# Patient Record
Sex: MALE | URBAN - METROPOLITAN AREA
[De-identification: names, ages, dates, MRNs, and addresses within clinical notes are randomized per-mention and may not be internally consistent; named-entity substitution may affect disease eponyms.]

---

## 2017-05-08 ENCOUNTER — IMPORTED ENCOUNTER (OUTPATIENT)
Dept: URBAN - METROPOLITAN AREA CLINIC 1 | Facility: CLINIC | Age: 82
End: 2017-05-08

## 2017-05-08 PROBLEM — Z79.84: Noted: 2017-05-08

## 2017-05-08 PROBLEM — E11.9: Noted: 2017-05-08

## 2017-05-08 PROBLEM — H25.813: Noted: 2017-05-08

## 2017-05-08 PROBLEM — H16.143: Noted: 2017-05-08

## 2017-05-08 PROBLEM — H40.023: Noted: 2017-05-08

## 2017-05-08 PROBLEM — H04.123: Noted: 2017-05-08

## 2017-05-08 PROCEDURE — 92133 CPTRZD OPH DX IMG PST SGM ON: CPT

## 2017-05-08 PROCEDURE — 92015 DETERMINE REFRACTIVE STATE: CPT

## 2017-05-08 PROCEDURE — 92014 COMPRE OPH EXAM EST PT 1/>: CPT

## 2017-05-08 NOTE — PATIENT DISCUSSION
1.  DM Type II (Oral Meds) without sign of diabetic retinopathy and no blot heme on dilated retinal examination today OU No Macular Edema:  Discussed the pathophysiology of diabetes and its effect on the eye and risk of blindness. Stressed the importance of strong glucose control. Advised of importance of at least yearly dilated examinations but to contact us immediately for any problems or concerns. 2. Glaucoma Suspect OU : (CD 0.75 OU) Neg FM Hx. Risk of cupping. AA. IOP stable on no meds. OCT shows no progression OU. Continue to observe on no meds. Patient is considered high risk. 3.  Cataract OU: Observe for now without intervention. The patient was advised to contact us if any change or worsening of vision4. ABNER w/ PEK OU- Continue ATs TID OU Routinely. Letter to [place] Mcdaniels for an appointment in 1 yr 27 OCT with Dr. Sung Campbell.

## 2018-04-26 PROBLEM — E66.01 OBESITY, MORBID (HCC): Status: ACTIVE | Noted: 2018-04-26

## 2018-04-29 PROBLEM — Z87.891 PAST USE OF TOBACCO: Status: ACTIVE | Noted: 2018-04-29

## 2018-04-29 PROBLEM — J98.01 BRONCHOSPASM: Status: ACTIVE | Noted: 2018-03-27

## 2018-04-29 PROBLEM — Z77.090 H/O ASBESTOS EXPOSURE: Status: ACTIVE | Noted: 2018-03-28

## 2018-04-29 PROBLEM — D64.9 SEVERE ANEMIA: Status: ACTIVE | Noted: 2018-03-27

## 2018-04-29 PROBLEM — J11.1 FLU: Status: ACTIVE | Noted: 2018-01-30

## 2018-08-06 ENCOUNTER — IMPORTED ENCOUNTER (OUTPATIENT)
Dept: URBAN - METROPOLITAN AREA CLINIC 1 | Facility: CLINIC | Age: 83
End: 2018-08-06

## 2018-08-06 PROBLEM — Z79.84: Noted: 2018-08-06

## 2018-08-06 PROBLEM — H40.023: Noted: 2018-08-06

## 2018-08-06 PROBLEM — H25.813: Noted: 2018-08-06

## 2018-08-06 PROBLEM — H04.123: Noted: 2018-08-06

## 2018-08-06 PROBLEM — E11.9: Noted: 2018-08-06

## 2018-08-06 PROBLEM — H16.143: Noted: 2018-08-06

## 2018-08-06 PROCEDURE — 92133 CPTRZD OPH DX IMG PST SGM ON: CPT

## 2018-08-06 PROCEDURE — 92015 DETERMINE REFRACTIVE STATE: CPT

## 2018-08-06 PROCEDURE — 92014 COMPRE OPH EXAM EST PT 1/>: CPT

## 2018-08-06 NOTE — PATIENT DISCUSSION
1.  DM Type II without sign of diabetic retinopathy and no blot heme on dilated retinal examination today OU No Macular Edema: Stable. Discussed the pathophysiology of diabetes and its effect on the eye and risk of blindness. Stressed the importance of strong glucose control. Advised of importance of at least yearly dilated examinations but to contact us immediately for any problems or concerns. 2. Type II diabetes controlled by oral medications. 3.  Cataract OU: Observe for now without intervention. The patient was advised to contact us if any change or worsening of vision4. ABNER w/ decreased PEK OU- Improved. The continuation of artificial tears were recommended. 5.  Glaucoma Suspect OU (0.75 OU): Stable IOP and C/D OU. OCT shows minimal thinning OU no progression. Patient is considered high risk. Condition was discussed with patient and patient understands. Will continue to monitor patient for any progression in condition. Patient was advised to call us with any problems questions or concerns. 6. Return for an appointment for 30/OCT/glare in 1 year with Dr. Thomas Gloria.

## 2019-04-23 PROBLEM — Z79.01 CHRONIC ANTICOAGULATION: Status: ACTIVE | Noted: 2018-05-24

## 2019-08-06 ENCOUNTER — IMPORTED ENCOUNTER (OUTPATIENT)
Dept: URBAN - METROPOLITAN AREA CLINIC 1 | Facility: CLINIC | Age: 84
End: 2019-08-06

## 2019-08-06 PROBLEM — H16.143: Noted: 2019-08-06

## 2019-08-06 PROBLEM — H04.123: Noted: 2019-08-06

## 2019-08-06 PROBLEM — H40.013: Noted: 2019-08-06

## 2019-08-06 PROBLEM — H25.813: Noted: 2019-08-06

## 2019-08-06 PROBLEM — R73.09: Noted: 2019-08-06

## 2019-08-06 PROBLEM — H43.812: Noted: 2019-08-06

## 2019-08-06 PROCEDURE — 92014 COMPRE OPH EXAM EST PT 1/>: CPT

## 2019-08-06 PROCEDURE — 92133 CPTRZD OPH DX IMG PST SGM ON: CPT

## 2019-08-06 NOTE — PATIENT DISCUSSION
1.  DM Type II (Diet Controlled) without sign of diabetic retinopathy and no blot heme on dilated retinal examination today OU No Macular Edema:  Discussed the pathophysiology of diabetes and its effect on the eye and risk of blindness. Stressed the importance of strong glucose control. Advised of importance of at least yearly dilated examinations but to contact us immediately for any problems or concerns. 2. Glaucoma Suspect OU (CD 0.80/0.75): OCT shows minimal thinning OU. IOP stable. Negative family hx. AA. Patient is considered Low Risk. Condition was discussed with patient and patient understands. Will continue to monitor patient for any progression in condition. Patient was advised to call us with any problems questions or concerns. 3.  Cataract OU: Observe for now without intervention. The patient was advised to contact us if any change or worsening of vision4. ABNER w/ PEK OU- Recommend ATs TID OU routinely 5. PVD w/o Tear OS- RD precautions. Patient deferred Manifest Rx today. Return for an appointment in 1 year 30/OCT/glare with Dr. Everardo Shelton.

## 2019-11-26 PROBLEM — N12 PYELONEPHRITIS: Status: ACTIVE | Noted: 2019-07-26

## 2019-11-26 PROBLEM — N17.9 ACUTE KIDNEY INJURY (HCC): Status: ACTIVE | Noted: 2019-07-27

## 2019-11-26 PROBLEM — J44.9 CHRONIC OBSTRUCTIVE PULMONARY DISEASE (HCC): Status: ACTIVE | Noted: 2019-11-26

## 2019-11-26 PROBLEM — E87.1 HYPONATREMIA: Status: ACTIVE | Noted: 2019-07-27

## 2020-08-04 ENCOUNTER — IMPORTED ENCOUNTER (OUTPATIENT)
Dept: URBAN - METROPOLITAN AREA CLINIC 1 | Facility: CLINIC | Age: 85
End: 2020-08-04

## 2020-08-04 PROBLEM — H40.013: Noted: 2020-08-04

## 2020-08-04 PROBLEM — H16.143: Noted: 2020-08-04

## 2020-08-04 PROBLEM — H43.812: Noted: 2020-08-04

## 2020-08-04 PROBLEM — H04.123: Noted: 2020-08-04

## 2020-08-04 PROBLEM — H40.033: Noted: 2020-08-04

## 2020-08-04 PROBLEM — R73.09: Noted: 2020-08-04

## 2020-08-04 PROBLEM — H25.813: Noted: 2020-08-04

## 2020-08-04 PROCEDURE — 92133 CPTRZD OPH DX IMG PST SGM ON: CPT

## 2020-08-04 PROCEDURE — 92014 COMPRE OPH EXAM EST PT 1/>: CPT

## 2020-08-04 PROCEDURE — 92015 DETERMINE REFRACTIVE STATE: CPT

## 2020-08-04 NOTE — PATIENT DISCUSSION
Cataract OU:  Visually Significant second to glare and patient has potentially occludible narrow angles. Discussed the risks benefits alternatives and limitations of cataract surgery. The patient stated a full understanding and a desire to proceed with the procedure. The patient will need to return for preop appointment with cataract measurements and to have any additional questions answered and start pre-operative eye drops as directed. Phaco PCL OS then OD Otherwise follow-up 6 months 10/DFE/glare

## 2020-08-04 NOTE — PATIENT DISCUSSION
1.  DM Type II (Diet Controlled) without sign of diabetic retinopathy and no blot heme on dilated retinal examination today OU No Macular Edema:  Discussed the pathophysiology of diabetes and its effect on the eye and risk of blindness. Stressed the importance of strong glucose control. Advised of importance of at least yearly dilated examinations but to contact us immediately for any problems or concerns. 2. Glaucoma Suspect OU (CD 0.80/0.75): No progression shown on OCT. IOP stable. Negative family hx. AA. Patient is considered Low Risk. Condition was discussed with patient and patient understands. Will continue to monitor patient for any progression in condition. Patient was advised to call us with any problems questions or concerns. 3.  Cataract OU:  Visually Significant second to glare and patient has potentially occludible narrow angles. Discussed the risks benefits alternatives and limitations of cataract surgery. The patient stated a full understanding and a desire to proceed with the procedure. The patient will need to return for preop appointment with cataract measurements and to have any additional questions answered and start pre-operative eye drops as directed. Phaco PCL OS then OD Otherwise follow-up 6 months 10/DFE/glare 4. ABNER w/ PEK OU- Recommend ATs TID OU routinely 5. Narrow Angles OU - Will defer Yag PI second to scheduling phaco. 6.  PVD w/o Tear OS- RD precautions. Return for an appointment for Ascan/H and P. with Dr. Willa Rosenthal.

## 2020-09-08 ENCOUNTER — IMPORTED ENCOUNTER (OUTPATIENT)
Dept: URBAN - METROPOLITAN AREA CLINIC 1 | Facility: CLINIC | Age: 85
End: 2020-09-08

## 2020-09-08 PROBLEM — H25.812: Noted: 2020-09-08

## 2020-09-08 PROCEDURE — 92136 OPHTHALMIC BIOMETRY: CPT

## 2020-09-08 NOTE — PATIENT DISCUSSION
1. Cataract OS --  Visually Significant secondary to glare discussed the risks benefits alternatives and limitations of cataract surgery. The patient stated a full understanding and a desire to proceed with the procedure. Discussed with patient if PO Gtts are more than $120 for all three combined when filling at their Pharmacy please call our office to request generic substitutions. Pt came to pre-op appointment today alone and Lifestyle Questionnaire Completed. Pt understands they will need glasses post-op to achieve their best corrected vision.  Phaco PCL OS

## 2020-09-17 ENCOUNTER — IMPORTED ENCOUNTER (OUTPATIENT)
Dept: URBAN - METROPOLITAN AREA CLINIC 1 | Facility: CLINIC | Age: 85
End: 2020-09-17

## 2020-09-17 PROBLEM — Z96.1: Noted: 2020-09-17

## 2020-09-17 PROCEDURE — 99024 POSTOP FOLLOW-UP VISIT: CPT

## 2020-09-17 NOTE — PATIENT DISCUSSION
POD#1 CE/IOL Standard OS doing well. Анна Hernandez in place. D/c Pred. Use Prolensa Qdaily OS Vigamox TID OS : Use gtts through completion of PO gtt chart regimen/ Per our instructions given to patient.   Post op Warnings Reiterated RTC as scheduled

## 2020-09-24 ENCOUNTER — IMPORTED ENCOUNTER (OUTPATIENT)
Dept: URBAN - METROPOLITAN AREA CLINIC 1 | Facility: CLINIC | Age: 85
End: 2020-09-24

## 2020-09-24 PROBLEM — H25.811: Noted: 2020-09-24

## 2020-09-24 PROCEDURE — 92136 OPHTHALMIC BIOMETRY: CPT

## 2020-09-24 NOTE — PATIENT DISCUSSION
1.  Cataract OD -- Visually Significant secondary to glare discussed the risks benefits alternatives and limitations of cataract surgery. The patient stated a full understanding and a desire to proceed with the procedure. Discussed with patient if PO Gtts are more than $120 for all three combined when filling at their Pharmacy please call our office to request generic substitutions. Pt understands they will need glasses post-op to achieve their best corrected vision. Phaco PCL OD 2. POW#3  CE/IOL Standard OS doing well. Ramonia Creeks in place. Use Prolensa Qdaily OS: Use gtts through completion of PO gtt chart regimen.  F/u as scheduled 2nd eye

## 2020-09-30 ENCOUNTER — IMPORTED ENCOUNTER (OUTPATIENT)
Dept: URBAN - METROPOLITAN AREA CLINIC 1 | Facility: CLINIC | Age: 85
End: 2020-09-30

## 2020-10-01 ENCOUNTER — IMPORTED ENCOUNTER (OUTPATIENT)
Dept: URBAN - METROPOLITAN AREA CLINIC 1 | Facility: CLINIC | Age: 85
End: 2020-10-01

## 2020-10-01 PROBLEM — Z96.1: Noted: 2020-10-01

## 2020-10-01 PROCEDURE — 99024 POSTOP FOLLOW-UP VISIT: CPT

## 2020-10-01 NOTE — PATIENT DISCUSSION
1. POD#1 Phaco/ PCL OD (Standard) doing well. *Marthann Elli in place. Use Prolensa Qdaily OD & Ocuflox TID OD : Use all gtts through completion of PO gtt chart regimen/ Per our instructions given. Post op Warnings Reiterated 2. POW#3 Phaco/ PCL OS (Standard) doing well. *Marthann Elli in place. Use Prolensa Qdaily OS: Use gtts through completion of PO gtt chart regimen.  RTC as scheduled

## 2020-10-22 ENCOUNTER — IMPORTED ENCOUNTER (OUTPATIENT)
Dept: URBAN - METROPOLITAN AREA CLINIC 1 | Facility: CLINIC | Age: 85
End: 2020-10-22

## 2020-10-22 PROBLEM — Z09: Noted: 2020-10-22

## 2020-10-22 PROCEDURE — 99024 POSTOP FOLLOW-UP VISIT: CPT

## 2020-10-22 NOTE — PATIENT DISCUSSION
POW#3 Phaco/ PCL Standard OU w/ Dextenza. Finish PO meds per PO gtt schedule MRX for glasses givenReturn for an appointment in July for a 30/OCT with Dr. Edilberto Hunt.

## 2021-01-26 ENCOUNTER — IMPORTED ENCOUNTER (OUTPATIENT)
Dept: URBAN - METROPOLITAN AREA CLINIC 1 | Facility: CLINIC | Age: 86
End: 2021-01-26

## 2021-03-22 ENCOUNTER — TELEPHONE (OUTPATIENT)
Dept: ORTHOPEDIC SURGERY | Age: 86
End: 2021-03-22

## 2021-03-22 NOTE — TELEPHONE ENCOUNTER
Pt was seen for an initial consultation in 2016 and none since. Pt can get on my chart and print out the OV note or can sign a ROR. This will have the information they need.

## 2021-03-22 NOTE — TELEPHONE ENCOUNTER
Patient's HIPAA person Donna Francisco called requesting a letter that dictates exactly when the patient back in 2016 was last treated and what he was treated for, for [facility] purposes.      [address] [facility]

## 2021-07-22 ENCOUNTER — IMPORTED ENCOUNTER (OUTPATIENT)
Dept: URBAN - METROPOLITAN AREA CLINIC 1 | Facility: CLINIC | Age: 86
End: 2021-07-22

## 2021-07-22 PROBLEM — H40.013: Noted: 2021-07-22

## 2021-07-22 PROBLEM — H04.123: Noted: 2021-07-22

## 2021-07-22 PROBLEM — H16.143: Noted: 2021-07-22

## 2021-07-22 PROBLEM — R73.09: Noted: 2021-07-22

## 2021-07-22 PROCEDURE — 92133 CPTRZD OPH DX IMG PST SGM ON: CPT

## 2021-07-22 PROCEDURE — 99214 OFFICE O/P EST MOD 30 MIN: CPT

## 2021-07-22 NOTE — PATIENT DISCUSSION
1.  DM Type II (Diet Controlled) without sign of diabetic retinopathy and no blot heme on dilated retinal examination today OU No Macular Edema:  Discussed the pathophysiology of diabetes and its effect on the eye and risk of blindness. Stressed the importance of strong glucose control. Advised of importance of at least yearly dilated examinations but to contact us immediately for any problems or concerns. 2. Glaucoma Suspect OU (CD 0.80/0.7) -- No progression shown on OCT. IOP stable. Negative family hx. AA. Patient is considered Low Risk. Condition was discussed with patient and patient understands. Will continue to monitor patient for any progression in condition. Patient was advised to call us with any problems questions or concerns. 3.  ABNER w/ PEK OU -- Recommend ATs TID OU routinely 4. Pseudophakia OU -- Standard OU. Doing well. 5.  PVD w/o Tear OS- RD precautions. Letter to PCP. Return for an appointment for 1 year for a 30 with Dr. Kelvin Giles.

## 2021-08-03 PROBLEM — I10 UNSPECIFIED ESSENTIAL HYPERTENSION: Status: RESOLVED | Noted: 2021-08-03 | Resolved: 2021-08-03

## 2021-08-03 PROBLEM — I10 HTN (HYPERTENSION): Status: RESOLVED | Noted: 2021-08-03 | Resolved: 2021-08-03

## 2022-03-18 PROBLEM — D64.9 SEVERE ANEMIA: Status: ACTIVE | Noted: 2018-03-27

## 2022-03-18 PROBLEM — E66.01 OBESITY, MORBID (HCC): Status: ACTIVE | Noted: 2018-04-26

## 2022-03-19 PROBLEM — Z77.090 H/O ASBESTOS EXPOSURE: Status: ACTIVE | Noted: 2018-03-28

## 2022-03-19 PROBLEM — J98.01 BRONCHOSPASM: Status: ACTIVE | Noted: 2018-03-27

## 2022-03-19 PROBLEM — N17.9 ACUTE KIDNEY INJURY (HCC): Status: ACTIVE | Noted: 2019-07-27

## 2022-03-19 PROBLEM — J44.9 CHRONIC OBSTRUCTIVE PULMONARY DISEASE (HCC): Status: ACTIVE | Noted: 2019-11-26

## 2022-03-19 PROBLEM — J11.1 FLU: Status: ACTIVE | Noted: 2018-01-30

## 2022-03-19 PROBLEM — Z79.01 CHRONIC ANTICOAGULATION: Status: ACTIVE | Noted: 2018-05-24

## 2022-03-19 PROBLEM — Z87.891 PAST USE OF TOBACCO: Status: ACTIVE | Noted: 2018-04-29

## 2022-03-20 PROBLEM — E87.1 HYPONATREMIA: Status: ACTIVE | Noted: 2019-07-27

## 2022-03-20 PROBLEM — N12 PYELONEPHRITIS: Status: ACTIVE | Noted: 2019-07-26

## 2022-04-02 ASSESSMENT — VISUAL ACUITY
OD_CC: 20/25
OD_CC: J1
OD_SC: 20/40
OD_GLARE: 20/400
OS_GLARE: 20/60
OD_CC: 20/30+1
OS_SC: 20/20-2
OD_GLARE: 20/400
OD_SC: 20/40
OS_SC: J2
OS_SC: 20/30
OD_GLARE: 20/60
OS_GLARE: 20/400
OS_GLARE: 20/400
OD_SC: 20/20
OD_SC: J2
OD_GLARE: 20/400
OS_SC: 20/40
OS_CC: 20/25
OD_CC: J1
OS_CC: J2
OS_GLARE: 20/400
OS_CC: J1
OS_CC: 20/30
OS_CC: J1
OS_CC: 20/30
OS_CC: 20/40
OS_CC: 20/20-2
OD_CC: 20/40
OD_SC: 20/20
OS_CC: 20/60
OD_CC: J2
OD_CC: 20/30
OD_CC: 20/20-2
OS_SC: 20/20
OS_CC: 20/30

## 2022-04-02 ASSESSMENT — TONOMETRY
OS_IOP_MMHG: 16
OD_IOP_MMHG: 12
OD_IOP_MMHG: 14
OS_IOP_MMHG: 14
OS_IOP_MMHG: 12
OD_IOP_MMHG: 14
OS_IOP_MMHG: 13
OS_IOP_MMHG: 13
OS_IOP_MMHG: 17
OD_IOP_MMHG: 15
OS_IOP_MMHG: 13
OS_IOP_MMHG: 15
OD_IOP_MMHG: 17
OS_IOP_MMHG: 17
OD_IOP_MMHG: 14
OD_IOP_MMHG: 15

## 2022-07-20 PROBLEM — R42 VERTIGO: Status: ACTIVE | Noted: 2020-12-14

## 2022-10-21 ENCOUNTER — COMPREHENSIVE EXAM (OUTPATIENT)
Dept: URBAN - METROPOLITAN AREA CLINIC 1 | Facility: CLINIC | Age: 87
End: 2022-10-21

## 2022-10-21 DIAGNOSIS — H26.493: ICD-10-CM

## 2022-10-21 DIAGNOSIS — H43.812: ICD-10-CM

## 2022-10-21 DIAGNOSIS — H40.013: ICD-10-CM

## 2022-10-21 DIAGNOSIS — H10.13: ICD-10-CM

## 2022-10-21 DIAGNOSIS — Z96.1: ICD-10-CM

## 2022-10-21 DIAGNOSIS — E11.9: ICD-10-CM

## 2022-10-21 DIAGNOSIS — H16.143: ICD-10-CM

## 2022-10-21 DIAGNOSIS — H04.123: ICD-10-CM

## 2022-10-21 PROCEDURE — 99214 OFFICE O/P EST MOD 30 MIN: CPT

## 2022-10-21 PROCEDURE — 92015 DETERMINE REFRACTIVE STATE: CPT

## 2022-10-21 ASSESSMENT — VISUAL ACUITY
OS_CC: 20/25-2
OD_CC: J1
OS_CC: J1
OD_CC: 20/20-2

## 2022-10-21 ASSESSMENT — TONOMETRY
OS_IOP_MMHG: 11
OD_IOP_MMHG: 12

## 2022-10-21 NOTE — PATIENT DISCUSSION
(Diet Controlled) without sign of diabetic retinopathy and no blot heme on dilated retinal examination today OU No Macular Edema: Discussed the pathophysiology of diabetes and its effect on the eye and risk of blindness. Stressed the importance of strong glucose control. Advised of importance of at least yearly dilated examinations but to contact us immediately for any problems or concerns.

## 2022-10-21 NOTE — PATIENT DISCUSSION
(CD 0.80/0.7) -- IOP stable. Negative family hx. AA. Patient is considered Low Risk. Condition was discussed with patient and patient understands. Will continue to monitor patient for any progression in condition. Patient was advised to call us with any problems questions or concerns.

## 2022-10-21 NOTE — PATIENT DISCUSSION
Recommend OTC Pataday QD OU, PRN itching. Condition discussed with patient. Avoidance of allergens recommended. Recommend Lid and lash hygiene with baby shampoo in the shower.

## 2023-06-08 PROBLEM — C64.9 RENAL CELL CARCINOMA (HCC): Status: ACTIVE | Noted: 2023-06-08

## 2023-06-08 PROBLEM — E78.5 OTHER AND UNSPECIFIED HYPERLIPIDEMIA: Status: ACTIVE | Noted: 2023-06-08

## 2023-06-08 PROBLEM — R06.02 SHORTNESS OF BREATH: Status: ACTIVE | Noted: 2018-03-27

## 2023-06-08 PROBLEM — R07.9 CHEST PAIN: Status: ACTIVE | Noted: 2023-02-16

## 2023-06-08 PROBLEM — R53.1 WEAKNESS: Status: ACTIVE | Noted: 2023-05-06

## 2023-06-08 PROBLEM — N39.0 URINARY TRACT INFECTION WITHOUT HEMATURIA: Status: ACTIVE | Noted: 2023-06-08

## 2023-06-08 PROBLEM — R55 NEAR SYNCOPE: Status: ACTIVE | Noted: 2023-06-08

## 2023-06-08 PROBLEM — J98.4 OTHER DISEASES OF LUNG, NOT ELSEWHERE CLASSIFIED: Status: ACTIVE | Noted: 2023-06-08

## 2023-06-08 PROBLEM — R10.13 EPIGASTRIC PAIN: Status: ACTIVE | Noted: 2023-05-06

## 2023-06-08 PROBLEM — E66.9 OBESE: Status: ACTIVE | Noted: 2018-03-27

## 2023-08-02 ENCOUNTER — OFFICE VISIT (OUTPATIENT)
Age: 88
End: 2023-08-02
Payer: MEDICARE

## 2023-08-02 VITALS — BODY MASS INDEX: 34.69 KG/M2 | WEIGHT: 234.2 LBS | HEIGHT: 69 IN

## 2023-08-02 DIAGNOSIS — C64.9 RENAL CELL CARCINOMA, UNSPECIFIED LATERALITY (HCC): ICD-10-CM

## 2023-08-02 DIAGNOSIS — M25.552 PAIN OF LEFT HIP: Primary | ICD-10-CM

## 2023-08-02 PROCEDURE — 99213 OFFICE O/P EST LOW 20 MIN: CPT | Performed by: PHYSICIAN ASSISTANT

## 2023-08-02 PROCEDURE — G8417 CALC BMI ABV UP PARAM F/U: HCPCS | Performed by: PHYSICIAN ASSISTANT

## 2023-08-02 PROCEDURE — 1036F TOBACCO NON-USER: CPT | Performed by: PHYSICIAN ASSISTANT

## 2023-08-02 PROCEDURE — 1123F ACP DISCUSS/DSCN MKR DOCD: CPT | Performed by: PHYSICIAN ASSISTANT

## 2023-08-02 PROCEDURE — G8428 CUR MEDS NOT DOCUMENT: HCPCS | Performed by: PHYSICIAN ASSISTANT

## 2023-08-02 NOTE — PROGRESS NOTES
Patient: Doretha Sue Sr. MRN: [id number]       SSN: [id number]  YOB: 1935        AGE: 82 y.o. SEX: male      PCP: Rodrigue Daniel MD  08/02/23    Chief Complaint   Patient presents with    Hip Pain     Lt        HISTORY:    Shayna Vincent Sr. is a 82 y.o. male presents to the office he believes for a concern regarding his left hip. Patient has a history documented of renal cell carcinoma. He is joined by his wife today. He reports pre-existing pain to his left hip which she has been treating with 70% alcohol \"wintergreen\" rubs. He reports no pain to the left hip today or low back.         No results found for: HBA1C, KNE1LDTI  Weight Metrics 8/2/2023 6/8/2023 2/9/2023 7/20/2022   Weight 234 lb 3.2 oz 228 lb 255 lb 255 lb   BMI (Calculated) 34.7 kg/m2 33.7 kg/m2 37.7 kg/m2 37.7 kg/m2          Problem List Items Addressed This Visit          Genitourinary    Renal cell carcinoma ([address])     Other Visit Diagnoses       Pain of left hip    -  Primary            PAST MEDICAL HISTORY:       Diagnosis Date    Aortic valve disorders     Arthritis 10/18/2010    BPH (benign prostatic hyperplasia)     Bronchitis, not specified as acute or chronic     Chronic diastolic heart failure (HCC)     Stable     Chronic kidney disease, stage II (mild)     Elevated prostate specific antigen (PSA)     Hypopotassemia     Nephrolithiasis     Obesity, unspecified     Obstructive sleep apnea (adult) (pediatric)     Other diseases of lung, not elsewhere classified     Pain in joint, pelvic region and thigh     Periodic limb movement disorder     Pulmonary embolus (HCC)     Pure hypercholesterolemia     Renal mass     Sciatica     Shortness of breath     Spinal stenosis, unspecified region other than cervical     Thoracic or lumbosacral neuritis or radiculitis, unspecified     Type II or unspecified type diabetes mellitus without mention of complication, not stated as uncontrolled

## 2024-07-24 ENCOUNTER — OFFICE VISIT (OUTPATIENT)
Age: 89
End: 2024-07-24
Payer: MEDICARE

## 2024-07-24 VITALS
DIASTOLIC BLOOD PRESSURE: 88 MMHG | TEMPERATURE: 97.2 F | OXYGEN SATURATION: 97 % | HEART RATE: 65 BPM | WEIGHT: 184.2 LBS | HEIGHT: 69 IN | SYSTOLIC BLOOD PRESSURE: 157 MMHG | BODY MASS INDEX: 27.28 KG/M2

## 2024-07-24 DIAGNOSIS — R94.31 ABNORMAL ECG: ICD-10-CM

## 2024-07-24 DIAGNOSIS — R06.02 EXERTIONAL SHORTNESS OF BREATH: ICD-10-CM

## 2024-07-24 DIAGNOSIS — R01.1 SYSTOLIC MURMUR: ICD-10-CM

## 2024-07-24 DIAGNOSIS — I35.9 AORTIC VALVE DISEASE: Primary | ICD-10-CM

## 2024-07-24 DIAGNOSIS — R55 NEAR SYNCOPE: ICD-10-CM

## 2024-07-24 DIAGNOSIS — I10 PRIMARY HYPERTENSION: ICD-10-CM

## 2024-07-24 DIAGNOSIS — I50.32 CHRONIC DIASTOLIC (CONGESTIVE) HEART FAILURE (HCC): ICD-10-CM

## 2024-07-24 PROCEDURE — 1123F ACP DISCUSS/DSCN MKR DOCD: CPT | Performed by: INTERNAL MEDICINE

## 2024-07-24 PROCEDURE — 1036F TOBACCO NON-USER: CPT | Performed by: INTERNAL MEDICINE

## 2024-07-24 PROCEDURE — 99214 OFFICE O/P EST MOD 30 MIN: CPT | Performed by: INTERNAL MEDICINE

## 2024-07-24 PROCEDURE — G8417 CALC BMI ABV UP PARAM F/U: HCPCS | Performed by: INTERNAL MEDICINE

## 2024-07-24 PROCEDURE — G8427 DOCREV CUR MEDS BY ELIG CLIN: HCPCS | Performed by: INTERNAL MEDICINE

## 2024-07-24 RX ORDER — FERROUS SULFATE 325(65) MG
1 TABLET ORAL DAILY
COMMUNITY
Start: 2024-05-24

## 2024-07-24 RX ORDER — DRONABINOL 5 MG/1
CAPSULE ORAL
COMMUNITY
Start: 2024-01-08

## 2024-07-24 RX ORDER — PREDNISONE 20 MG/1
TABLET ORAL
COMMUNITY

## 2024-07-24 RX ORDER — DAPAGLIFLOZIN 5 MG/1
TABLET, FILM COATED ORAL
COMMUNITY
Start: 2023-11-03 | End: 2024-11-01

## 2024-07-24 RX ORDER — PROCHLORPERAZINE MALEATE 10 MG
TABLET ORAL
COMMUNITY
Start: 2024-04-16

## 2024-07-24 RX ORDER — PANTOPRAZOLE SODIUM 40 MG/1
TABLET, DELAYED RELEASE ORAL
COMMUNITY
Start: 2024-01-11 | End: 2024-07-24

## 2024-07-24 RX ORDER — HYDRALAZINE HYDROCHLORIDE 50 MG/1
TABLET, FILM COATED ORAL
COMMUNITY

## 2024-07-24 RX ORDER — DAPAGLIFLOZIN 10 MG/1
TABLET, FILM COATED ORAL DAILY
COMMUNITY

## 2024-07-24 RX ORDER — ATORVASTATIN CALCIUM 20 MG/1
TABLET, FILM COATED ORAL
COMMUNITY

## 2024-07-24 RX ORDER — CAPECITABINE 500 MG/1
TABLET, FILM COATED ORAL
COMMUNITY
Start: 2023-09-05 | End: 2024-09-04

## 2024-07-24 RX ORDER — AMLODIPINE AND OLMESARTAN MEDOXOMIL 10; 40 MG/1; MG/1
1 TABLET ORAL DAILY
COMMUNITY

## 2024-07-24 RX ORDER — ESCITALOPRAM OXALATE 5 MG/1
TABLET ORAL
COMMUNITY
Start: 2023-07-16 | End: 2024-10-12

## 2024-07-24 RX ORDER — CEPHALEXIN 500 MG/1
CAPSULE ORAL
COMMUNITY
Start: 2024-03-27

## 2024-07-24 RX ORDER — TRAMADOL HYDROCHLORIDE 50 MG/1
TABLET ORAL
COMMUNITY
Start: 2023-12-01

## 2024-07-24 RX ORDER — OMEPRAZOLE 40 MG/1
CAPSULE, DELAYED RELEASE ORAL
COMMUNITY
Start: 2023-07-15 | End: 2024-10-12

## 2024-07-24 RX ORDER — BENZONATATE 100 MG/1
1 CAPSULE ORAL
COMMUNITY
Start: 2023-11-06

## 2024-07-24 RX ORDER — AMLODIPINE AND OLMESARTAN MEDOXOMIL 10; 20 MG/1; MG/1
TABLET ORAL
COMMUNITY
Start: 2023-11-10

## 2024-07-24 RX ORDER — OXYCODONE HYDROCHLORIDE 5 MG/1
TABLET ORAL
COMMUNITY
Start: 2024-01-24

## 2024-07-24 RX ORDER — SUCRALFATE 1 G/1
TABLET ORAL
COMMUNITY
Start: 2024-01-11

## 2024-07-24 RX ORDER — PANTOPRAZOLE SODIUM 40 MG/1
TABLET, DELAYED RELEASE ORAL
COMMUNITY
Start: 2024-01-11

## 2024-07-24 RX ORDER — PHENAZOPYRIDINE HYDROCHLORIDE 100 MG/1
TABLET, FILM COATED ORAL
COMMUNITY
Start: 2024-03-07

## 2024-07-24 RX ORDER — AMOXICILLIN 250 MG
CAPSULE ORAL
COMMUNITY
Start: 2023-07-15 | End: 2024-10-15

## 2024-07-24 RX ORDER — KETOCONAZOLE 20 MG/G
CREAM TOPICAL
COMMUNITY

## 2024-07-24 RX ORDER — DAPAGLIFLOZIN 5 MG/1
TABLET, FILM COATED ORAL
COMMUNITY

## 2024-07-24 RX ORDER — LOSARTAN POTASSIUM 25 MG/1
TABLET ORAL
COMMUNITY

## 2024-07-24 RX ORDER — MIRTAZAPINE 15 MG/1
15 TABLET, FILM COATED ORAL NIGHTLY
COMMUNITY
Start: 2024-07-15

## 2024-07-24 RX ORDER — NITROFURANTOIN 25; 75 MG/1; MG/1
CAPSULE ORAL
COMMUNITY
Start: 2024-03-07

## 2024-07-24 ASSESSMENT — ENCOUNTER SYMPTOMS
SHORTNESS OF BREATH: 0
ALLERGIC/IMMUNOLOGIC NEGATIVE: 1
EYES NEGATIVE: 1
GASTROINTESTINAL NEGATIVE: 1

## 2024-07-24 NOTE — PROGRESS NOTES
1. \"Have you been to the ER, urgent care clinic since your last visit?  Hospitalized since your last visit?\" Reviewed by Dr. Pradeep Hidalgo    2. \"Have you seen or consulted any other health care providers outside of the [facility] since your last visit?\" Reviewed by Dr. Pradeep Hidalgo

## 2024-07-24 NOTE — PROGRESS NOTES
Shayna Jeremy Carlton . (:  1935) is a 88 y.o. male,Established patient, here for evaluation of the following chief complaint(s):  Follow-up    Subjective   SUBJECTIVE/OBJECTIVE:  HPI  Patient presents today for follow-up.  He has a history of aortic valve disease and hypertension.           Today, he is doing fair. He has had issues with neck discomfort radiating down into his right arm.  He was found to have some form of head and neck cancer for which she is on treatment.  He has lost his appetite and is not doing well in that regard.  Blood pressures are elevated today but he assures me that blood pressures are better controlled at home.  He was having shortness of breath but that has improved.  He does have at least moderate to severe aortic stenosis and I referred him to structural heart who did not want to pursue any treatment given his other medical problems at this time.No syncope. No orthopnea. No history of known coronary disease or systolic heart failure.           I have carefully reviewed all available medical records, previous office notes, lab, x-ray, and procedure reports.    Past Medical History:   Diagnosis Date    Aortic valve disorders     Arthritis 10/18/2010    BPH (benign prostatic hyperplasia)     Bronchitis, not specified as acute or chronic     Chronic diastolic heart failure (HCC)     Stable     Chronic kidney disease, stage II (mild)     Elevated prostate specific antigen (PSA)     Hypopotassemia     Nephrolithiasis     Obesity, unspecified     Obstructive sleep apnea (adult) (pediatric)     Other diseases of lung, not elsewhere classified     Pain in joint, pelvic region and thigh     Periodic limb movement disorder     Pulmonary embolus (HCC)     Pure hypercholesterolemia     Renal mass     Sciatica     Shortness of breath     Spinal stenosis, unspecified region other than cervical     Thoracic or lumbosacral neuritis or radiculitis, unspecified     Type II or unspecified type

## 2024-09-13 PROBLEM — K92.2 UGIB (UPPER GASTROINTESTINAL BLEED): Status: ACTIVE | Noted: 2023-09-04

## 2024-09-13 PROBLEM — R49.0 HOARSENESS: Status: ACTIVE | Noted: 2024-09-13

## 2024-09-13 PROBLEM — R52 PAIN: Status: ACTIVE | Noted: 2024-09-13

## 2024-09-13 PROBLEM — C16.9 MALIGNANT NEOPLASM OF STOMACH (HCC): Status: ACTIVE | Noted: 2024-09-13

## 2024-09-13 PROBLEM — C32.9 SQUAMOUS CELL CARCINOMA OF LARYNX (HCC): Status: ACTIVE | Noted: 2024-09-13

## 2024-09-13 PROBLEM — R79.89 HIGH SERUM CREATININE: Status: ACTIVE | Noted: 2024-09-13

## 2024-09-13 PROBLEM — T45.1X5A CHEMOTHERAPY-INDUCED NAUSEA AND VOMITING: Status: ACTIVE | Noted: 2024-09-13

## 2024-09-13 PROBLEM — R11.2 CHEMOTHERAPY-INDUCED NAUSEA AND VOMITING: Status: ACTIVE | Noted: 2024-09-13

## 2024-09-13 PROBLEM — R63.4 WEIGHT LOSS: Status: ACTIVE | Noted: 2024-09-13

## 2024-09-13 PROBLEM — K92.1 MELENA: Status: ACTIVE | Noted: 2023-07-10

## 2024-09-13 PROBLEM — R53.83 FATIGUE: Status: ACTIVE | Noted: 2024-09-13

## 2024-09-13 PROBLEM — R60.0 EDEMA OF UPPER EXTREMITY: Status: ACTIVE | Noted: 2024-09-13

## 2024-10-13 PROBLEM — R52 PAIN: Status: RESOLVED | Noted: 2024-09-13 | Resolved: 2024-10-13

## 2024-12-06 ENCOUNTER — COMPREHENSIVE EXAM (OUTPATIENT)
Age: 89
End: 2024-12-06

## 2024-12-06 DIAGNOSIS — H40.013: ICD-10-CM

## 2024-12-06 DIAGNOSIS — E11.9: ICD-10-CM

## 2024-12-06 DIAGNOSIS — H26.493: ICD-10-CM

## 2024-12-06 DIAGNOSIS — H16.143: ICD-10-CM

## 2024-12-06 DIAGNOSIS — H04.123: ICD-10-CM

## 2024-12-06 PROCEDURE — 99214 OFFICE O/P EST MOD 30 MIN: CPT

## 2025-01-06 RX ORDER — AMLODIPINE BESYLATE 5 MG/1
5 TABLET ORAL DAILY
Qty: 30 TABLET | Refills: 11 | Status: SHIPPED | OUTPATIENT
Start: 2025-01-06

## 2025-01-06 NOTE — TELEPHONE ENCOUNTER
PCP: Timur Telles MD    Last appt:  7/24/2024   Future Appointments   Date Time Provider Department Center   2/27/2025  8:30 AM BS [facility] 2 [facility]   2/27/2025  9:15 AM Pradeep Hidalgo Sr., MD [facility]   9/12/2025  9:00 AM Leilani Carmen PA [facility] [place] Sched       Requested Prescriptions     Pending Prescriptions Disp Refills    amLODIPine (NORVASC) 5 MG tablet 30 tablet 11     Sig: Take 1 tablet by mouth daily       Request for a 30 or 90 day supply? Provider Discretion    Pharmacy: correct in system per family request    Other Comments:

## 2025-02-18 DIAGNOSIS — R06.02 EXERTIONAL SHORTNESS OF BREATH: ICD-10-CM

## 2025-02-18 DIAGNOSIS — R55 NEAR SYNCOPE: ICD-10-CM

## 2025-02-18 DIAGNOSIS — I35.9 AORTIC VALVE DISEASE: Primary | ICD-10-CM

## 2025-02-18 DIAGNOSIS — I10 PRIMARY HYPERTENSION: ICD-10-CM

## 2025-04-11 ENCOUNTER — OFFICE VISIT (OUTPATIENT)
Age: 89
End: 2025-04-11
Payer: MEDICARE

## 2025-04-11 VITALS
HEIGHT: 69 IN | SYSTOLIC BLOOD PRESSURE: 104 MMHG | HEART RATE: 77 BPM | DIASTOLIC BLOOD PRESSURE: 58 MMHG | WEIGHT: 247.4 LBS | TEMPERATURE: 97 F | OXYGEN SATURATION: 97 % | BODY MASS INDEX: 36.64 KG/M2

## 2025-04-11 DIAGNOSIS — N18.32 STAGE 3B CHRONIC KIDNEY DISEASE (HCC): ICD-10-CM

## 2025-04-11 DIAGNOSIS — I35.0 NONRHEUMATIC AORTIC VALVE STENOSIS: Primary | ICD-10-CM

## 2025-04-11 DIAGNOSIS — I50.32 CHRONIC DIASTOLIC HEART FAILURE (HCC): ICD-10-CM

## 2025-04-11 DIAGNOSIS — G47.33 OBSTRUCTIVE SLEEP APNEA (ADULT) (PEDIATRIC): ICD-10-CM

## 2025-04-11 DIAGNOSIS — I25.10 CORONARY ARTERY DISEASE INVOLVING NATIVE CORONARY ARTERY OF NATIVE HEART WITHOUT ANGINA PECTORIS: ICD-10-CM

## 2025-04-11 DIAGNOSIS — I10 ESSENTIAL HYPERTENSION, BENIGN: ICD-10-CM

## 2025-04-11 PROCEDURE — 1126F AMNT PAIN NOTED NONE PRSNT: CPT

## 2025-04-11 PROCEDURE — G8428 CUR MEDS NOT DOCUMENT: HCPCS

## 2025-04-11 PROCEDURE — G8417 CALC BMI ABV UP PARAM F/U: HCPCS

## 2025-04-11 PROCEDURE — 1036F TOBACCO NON-USER: CPT

## 2025-04-11 PROCEDURE — 1123F ACP DISCUSS/DSCN MKR DOCD: CPT

## 2025-04-11 PROCEDURE — 99214 OFFICE O/P EST MOD 30 MIN: CPT

## 2025-04-11 RX ORDER — DAPAGLIFLOZIN 5 MG/1
5 TABLET, FILM COATED ORAL EVERY MORNING
Qty: 90 TABLET | Refills: 3 | Status: SHIPPED | OUTPATIENT
Start: 2025-04-11

## 2025-04-11 RX ORDER — ATORVASTATIN CALCIUM 40 MG/1
40 TABLET, FILM COATED ORAL NIGHTLY
COMMUNITY
Start: 2025-03-05

## 2025-04-11 RX ORDER — ESCITALOPRAM OXALATE 5 MG/1
TABLET ORAL
COMMUNITY
Start: 2025-03-17

## 2025-04-11 ASSESSMENT — PATIENT HEALTH QUESTIONNAIRE - PHQ9
SUM OF ALL RESPONSES TO PHQ QUESTIONS 1-9: 0
2. FEELING DOWN, DEPRESSED OR HOPELESS: NOT AT ALL
1. LITTLE INTEREST OR PLEASURE IN DOING THINGS: NOT AT ALL

## 2025-04-11 ASSESSMENT — ENCOUNTER SYMPTOMS
DIARRHEA: 0
NAUSEA: 0
COUGH: 0
RHINORRHEA: 0
ABDOMINAL PAIN: 0
SHORTNESS OF BREATH: 0
VOMITING: 0
WHEEZING: 0
SORE THROAT: 0

## 2025-04-11 NOTE — PROGRESS NOTES
1. \"Have you been to the ER, urgent care clinic since your last visit?  Hospitalized since your last visit?\" Reviewed by KATHERINE Reno    2. \"Have you seen or consulted any other health care providers outside of the [facility] since your last visit?\" Reviewed by KATHERINE Reno

## 2025-04-11 NOTE — PATIENT INSTRUCTIONS
beans.  Where can you learn more?  Go to [email or web address].net/patientEd and enter H967 to learn more about \"DASH Diet: Care Instructions.\"  Current as of: October 7, 2024  Content Version: 14.4  © 7928-3485 [employer].   Care instructions adapted under license by [employer]. If you have questions about a medical condition or this instruction, always ask your healthcare professional. [employer] disclaims any warranty or liability for your use of this information.

## 2025-04-11 NOTE — PROGRESS NOTES
Shayna Nmn Carlton . (:  1935) is a 89 y.o. 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    Shayna was recently hospitalized 3/3/2025 through 3/5/2025 with GI bleeding.  It was suspected that he may have recurrence of gastric adenocarcinoma but he did not remain inpatient for endoscopy.  History of Present Illness  He was hospitalized in early 2025 due to an episode of bleeding, which subsequently ceased. During his emergency room stay, he was administered fluids but no other interventions were made. He spent 13 hours in the emergency room before being admitted to a room, where he remained for an additional 2 days. His primary care physician expressed concern over the lack of treatment during this period. He reports no further episodes of bleeding. His respiratory function appears to be satisfactory. He recently consulted with a specialist regarding his valve condition, which is currently under observation. He reports no issues with chest pain or breathing. He is on a regimen of Farxiga 5 mg.    His blood pressure readings at home have been inconsistent, with a reading of 109 last night. It is observed that his readings tend to be higher when measured by another person compared to when he measures them himself. He experienced an episode of lightheadedness a few days ago, which was attributed to rapid movements. His symptoms improved following fluid administration at the hospital.    He is currently undergoing treatment for cancer with Keytruda and has recently started a new chemotherapy regimen due to a recurrence of his cancer. He has an upcoming appointment with Dr. Escobedo on the  for his kidney condition.    MEDICATIONS  Current: Farxiga,

## 2025-04-14 NOTE — TELEPHONE ENCOUNTER
PCP: Timur Telles MD    Last appt:  7/24/2024   Future Appointments   Date Time Provider Department Center   9/12/2025  9:00 AM [facility]   10/10/2025  1:20 PM Nicole Colon PA-C [facility]       Requested Prescriptions     Pending Prescriptions Disp Refills    dapagliflozin (FARXIGA) 5 MG tablet 90 tablet 3     Sig: Take 1 tablet by mouth every morning Pt called to have medication sent to [facility]       Request for a 30 or 90 day supply? Provider Discretion    Pharmacy: confirmed    Other Comments: n/a

## 2025-04-15 ENCOUNTER — TELEPHONE (OUTPATIENT)
Age: 89
End: 2025-04-15

## 2025-04-15 NOTE — TELEPHONE ENCOUNTER
Wife called and left a voice message on the phone, stating that his BP was low at the last visit and is still running low. She wants to know if she can decrease the two meds he takes to 1/2 the dosage.  She would like a return call  at [phone].    BP Friday 104/58 on Friday here in the office.    Please call the patients family and obtain BP readings.

## 2025-04-17 RX ORDER — DAPAGLIFLOZIN 5 MG/1
5 TABLET, FILM COATED ORAL EVERY MORNING
Qty: 90 TABLET | Refills: 3 | Status: SHIPPED | OUTPATIENT
Start: 2025-04-17

## 2025-04-17 NOTE — TELEPHONE ENCOUNTER
Patient' s wife called today, stating that she has not had a call back yet and was wondering about his BP. I identified the patient by full name and , as well as the wifes name.    We reviewed meds and did a med clean up list.   She did give me todays /55 and he did take his medication.    Yesterday they held the amlodipine olmesartan 10/20 mg tablet and his BP were 117/60 and 106/55.     Then Tuesday he did take his meds and BP was 105/72.      I reviewed these with VANDANA GARCIA, and she would like to keep all the meds the same, but if the top number drops down below 100 on the top, then they need to notify us.  I relayed the information to the patient and she agreed. But then she asked about dizziness. She said it was not often.    I asked her to keep an eye on his BP, make sure that he is taking fluids and he can also have jello, itilian ice and popcicles which count as a fluid as well, and dont want him to get dehydrated.  I asked her to write down the times he c/o dizziness and what he is doing at the time. Will do for about 4-5 days with BP and then will call the results to the office.   Patient's wife agreed to the plan.